# Patient Record
Sex: FEMALE | Race: WHITE | NOT HISPANIC OR LATINO | Employment: FULL TIME | URBAN - METROPOLITAN AREA
[De-identification: names, ages, dates, MRNs, and addresses within clinical notes are randomized per-mention and may not be internally consistent; named-entity substitution may affect disease eponyms.]

---

## 2022-05-06 ENCOUNTER — TELEPHONE (OUTPATIENT)
Dept: OBGYN CLINIC | Facility: CLINIC | Age: 27
End: 2022-05-06

## 2022-05-06 DIAGNOSIS — Z30.41 SURVEILLANCE FOR BIRTH CONTROL, ORAL CONTRACEPTIVES: Primary | ICD-10-CM

## 2022-05-06 NOTE — TELEPHONE ENCOUNTER
Silvia Arenas said was approx  1-2 weeks with starting current pill pack  Advised her to stop taking pills x 4 days then restart a new pill pack  Silvia Arenas also needs a renewal on her pills too   Sent message to Dr Brandi Blackmon re; medication renewal

## 2022-05-09 RX ORDER — NORGESTIMATE AND ETHINYL ESTRADIOL 0.25-0.035
1 KIT ORAL DAILY
Qty: 28 TABLET | Refills: 3 | Status: SHIPPED | OUTPATIENT
Start: 2022-05-09 | End: 2022-07-22 | Stop reason: SDUPTHER

## 2022-05-10 ENCOUNTER — TELEPHONE (OUTPATIENT)
Dept: OBGYN CLINIC | Facility: CLINIC | Age: 27
End: 2022-05-10

## 2022-05-10 NOTE — TELEPHONE ENCOUNTER
Branda Dancer called in on Friday regarding break through bleeding and was advised to stop her pill pack for four days and to start up again after 4 days  Branda Dancer was calling to clarify if she is to start a new pill pack or resume the pill pack she stopped  Advised her she is to start a new pack the pack she stopped she should throw away  Keep an eye on break through bleeding if it occurs again [place] the days on her pill pack so we can see when the bleeding occurs and contact the office

## 2022-05-15 ENCOUNTER — TELEPHONE (OUTPATIENT)
Dept: OBGYN CLINIC | Facility: CLINIC | Age: 27
End: 2022-05-15

## 2022-05-15 NOTE — TELEPHONE ENCOUNTER
Patient paged on-call Doc stating she is in [place] and has heavy bleeding  Called patient and patient states she is on 1st week of new pack of OCP and yesterday the bleeding was light but today she is passing golf size clot every hour  In 6-7 hours has changed 3 pads  Advised patient to continue with OCPs, take 600 mg of Advil every 6 hours as needed for cramping and bleeding  Advised patient to seek medical attention if soaking a pad an hour  Advised patient to keep menstrual/bleeding calender and review with Dr Ilya Ferro if irregular bleeding or BTB continues

## 2022-05-27 ENCOUNTER — TELEPHONE (OUTPATIENT)
Dept: OBGYN CLINIC | Facility: CLINIC | Age: 27
End: 2022-05-27

## 2022-05-27 DIAGNOSIS — N92.6 IRREGULAR BLEEDING: Primary | ICD-10-CM

## 2022-05-27 NOTE — TELEPHONE ENCOUNTER
Zenaida Boyd called the nurses line to discuss continuation of breakthrough bleeding on her OCP  She called on 5/6 regarding bleeding about 1-2 weeks in on her new pill pack  At that time she was advises to stop the current pill pack and after a 4 day break start a new pill pack  Since starting the new pill pack she is continuing with breakthrough bleeding  She called the on-call Dr  On 5/15 which was Dr Tricia Ybarra and spoke to her regarding heavy flow with clots  Dr Tricia Ybarra advised her to continue OCP and take 600mg of advil every 6 hours as needed  And went over precautions for seeking medical advice  She still is having breakthrough bleeding/spotting  It is not as heavy as the week of 5/15  She does have a history of anemia and is wondering if she should get labwork done to check her Iron level  Pt has an apt with Dr Santy Pineda on 7/22/22  Dr Santy Pineda,  Please advise, can we order a hemoglobin for pt, and would you recommend early OV?

## 2022-05-27 NOTE — TELEPHONE ENCOUNTER
Orders for TSH and CBC placed for [facility], look on nursing printer please  Yes, should be seen earlier   Last visit 2/2021

## 2022-05-27 NOTE — TELEPHONE ENCOUNTER
Spoke with Catherine Mendiola, she is aware of labs to be drawn at [address] [place] [address] and transferred upfront to get an apt with Dr Danelle Cannon before her visit on 7/22 for abnormal bleeding

## 2022-05-28 LAB
BASOPHILS # BLD AUTO: 0 X10E3/UL (ref 0–0.2)
BASOPHILS NFR BLD AUTO: 1 %
EOSINOPHIL # BLD AUTO: 0.1 X10E3/UL (ref 0–0.4)
EOSINOPHIL NFR BLD AUTO: 2 %
ERYTHROCYTE [DISTWIDTH] IN BLOOD BY AUTOMATED COUNT: 14.4 % (ref 11.7–15.4)
HCT VFR BLD AUTO: 24.1 % (ref 34–46.6)
HGB BLD-MCNC: 8 G/DL (ref 11.1–15.9)
IMM GRANULOCYTES # BLD: 0 X10E3/UL (ref 0–0.1)
IMM GRANULOCYTES NFR BLD: 0 %
LYMPHOCYTES # BLD AUTO: 2.8 X10E3/UL (ref 0.7–3.1)
LYMPHOCYTES NFR BLD AUTO: 41 %
MCH RBC QN AUTO: 28.5 PG (ref 26.6–33)
MCHC RBC AUTO-ENTMCNC: 33.2 G/DL (ref 31.5–35.7)
MCV RBC AUTO: 86 FL (ref 79–97)
MONOCYTES # BLD AUTO: 0.4 X10E3/UL (ref 0.1–0.9)
MONOCYTES NFR BLD AUTO: 6 %
NEUTROPHILS # BLD AUTO: 3.5 X10E3/UL (ref 1.4–7)
NEUTROPHILS NFR BLD AUTO: 50 %
PLATELET # BLD AUTO: 410 X10E3/UL (ref 150–450)
RBC # BLD AUTO: 2.81 X10E6/UL (ref 3.77–5.28)
TSH SERPL DL<=0.005 MIU/L-ACNC: 3.52 UIU/ML (ref 0.45–4.5)
WBC # BLD AUTO: 6.9 X10E3/UL (ref 3.4–10.8)

## 2022-05-31 ENCOUNTER — TELEPHONE (OUTPATIENT)
Dept: OBGYN CLINIC | Facility: CLINIC | Age: 27
End: 2022-05-31

## 2022-05-31 NOTE — TELEPHONE ENCOUNTER
Spoke with Mk Montalvo, she is aware of her Hemoglobin and already has a call in to the her hematologist waiting for a call back  She does have an apt with Dr Martita Galvan on 6/28 which she said she would be glad to discuss the IUD with her then  Delgrace Montalvo had questions regarding her current pill pack should she skip to the next pill pack or take the placebo pill? Dr Martita Galvan,  Please advise, and the earlier Delos Creamrafat could get is 6/28 is that okay to discuss IUD?

## 2022-05-31 NOTE — TELEPHONE ENCOUNTER
She should skip the placebo week after this pill pack in an attempt to hold off the bleeding  The 6/28/22 would be fine

## 2022-05-31 NOTE — TELEPHONE ENCOUNTER
Please call Heather Olivia with labs  Hg 8 - has h/o anemia with iron infusions with hematology  Her TSH was normal    Encourage her to call Dr Jennifer Mendez (?I believe that is who she saw) for follow up and repeat infusions  She should consider the progesterone IUD for control of the menorrhagia  Please schedule appointment with me to discuss  Thanks

## 2022-06-23 ENCOUNTER — VBI (OUTPATIENT)
Dept: ADMINISTRATIVE | Facility: OTHER | Age: 27
End: 2022-06-23

## 2022-06-23 NOTE — TELEPHONE ENCOUNTER
Upon review of the In Basket request we were able to locate, review, and update the patient chart as requested for Pap Smear (HPV) aka Cervical Cancer Screening  Any additional questions or concerns should be emailed to the Practice Liaisons via [email or web address] email, please do not reply via In Basket      Thank you  Waylon Montiel

## 2022-06-25 PROBLEM — R22.1 NECK NODULE: Status: ACTIVE | Noted: 2022-06-25

## 2022-06-25 PROBLEM — D50.0 IRON DEFICIENCY ANEMIA DUE TO CHRONIC BLOOD LOSS: Status: ACTIVE | Noted: 2022-06-25

## 2022-06-25 PROBLEM — N92.1 MENORRHAGIA WITH IRREGULAR CYCLE: Status: ACTIVE | Noted: 2022-06-25

## 2022-06-25 NOTE — PROGRESS NOTES
Assessment/Plan:    Iron deficiency anemia due to chronic blood loss  Last infusion last week with improved symptoms, scheduled today for 2nd  Dr Morris Tucker    Menorrhagia with irregular cycle - resolved with OCPs  Had normal light two day cycles for one year on OCPs until ran out  Off for 3ks when out of rx with heavy bleeding VERY heavy while in [facility] 2nd week of May  Started back up in May  Bleeding continues, now light  Hg was down to 8, saw hematology and iron infusions began again last week  Normal exam, no active bleeding  Uterus small  U/s ordered, normal 3/2020  Will get blood work from hematology for Bethany Chroman, hemophilia and TSH today  Recommend doubling up on pill until bleeding stops, then daily, hopefully to achieve 21 days without bleeding  Then back to cyclic admin of pill     Option of progesterone IUD reviewed, R&B discussed  NSA, had success with OCPs in past  Will attempt OCP management first  Agrees to plan  BMI 35 0-35 9,adult  Has lost 40 lbs since last visit with healthy life choices  Diagnoses and all orders for this visit:    Menorrhagia with irregular cycle - resolved with OCPs    Abnormal vaginal bleeding  -     Cancel: POCT urine HCG  -     US pelvis complete w transvaginal; Future    BMI 35 0-35 9,adult    Other orders  -     albuterol (PROVENTIL HFA,VENTOLIN HFA) 90 mcg/act inhaler; Inhale 2 puffs every 4 (four) hours as needed  -     cetirizine (ZyrTEC) 10 mg tablet; Take 10 mg by mouth daily          Subjective:      Patient ID: Duane Hsu is a 33 y o  female  Here for prolonged bleeding  The following portions of the patient's history were reviewed and updated as appropriate:   She  has a past medical history of Anemia, Asthma, Gardasil complete, History of pelvic ultrasound (02/10/2020), and Marfan syndrome    She   Patient Active Problem List    Diagnosis Date Noted    BMI 35 0-35 9,adult 06/28/2022    Menorrhagia with irregular cycle - resolved with OCPs 06/25/2022    Iron deficiency anemia due to chronic blood loss 06/25/2022    Neck nodule 06/25/2022    Marfan syndrome 03/18/2014    Hx of echocardiogram 03/18/2014     She  has a past surgical history that includes Tonsillectomy (2001)  Her family history includes Marfan syndrome in her brother, brother, father, and paternal grandmother  She  reports that she has never smoked  She does not have any smokeless tobacco history on file  No history on file for alcohol use and drug use  Current Outpatient Medications   Medication Sig Dispense Refill    albuterol (PROVENTIL HFA,VENTOLIN HFA) 90 mcg/act inhaler Inhale 2 puffs every 4 (four) hours as needed      cetirizine (ZyrTEC) 10 mg tablet Take 10 mg by mouth daily      norgestimate-ethinyl estradiol (Ortho-Cyclen, 28,) 0 25-35 MG-MCG per tablet Take 1 tablet by mouth daily 28 tablet 3     No current facility-administered medications for this visit  She has No Known Allergies       Review of Systems  +prolonged bleeding, now lighter    Objective:      /70 (BP Location: Left arm, Patient Position: Sitting, Cuff Size: Standard)   Ht 5' 10" (1 778 m)   Wt 112 kg (247 lb 9 6 oz)   BMI 35 53 kg/m²          Physical Exam    Appears well, no apparent distress  Does not appear anxious or depressed    Pelvic exam: blood stained external genitalia, urethral meatus normal, vagina without lesions,minimal blood no pooling, cervix without lesions, no active bleeding, bimanual limited due to habitus, uterus small, non tender, no adnexal masses, non tender  Rectal exam: deferred

## 2022-06-28 ENCOUNTER — OFFICE VISIT (OUTPATIENT)
Dept: OBGYN CLINIC | Facility: CLINIC | Age: 27
End: 2022-06-28
Payer: COMMERCIAL

## 2022-06-28 VITALS
DIASTOLIC BLOOD PRESSURE: 70 MMHG | BODY MASS INDEX: 35.45 KG/M2 | WEIGHT: 247.6 LBS | SYSTOLIC BLOOD PRESSURE: 122 MMHG | HEIGHT: 70 IN

## 2022-06-28 DIAGNOSIS — N92.1 MENORRHAGIA WITH IRREGULAR CYCLE: Primary | ICD-10-CM

## 2022-06-28 DIAGNOSIS — N93.9 ABNORMAL VAGINAL BLEEDING: ICD-10-CM

## 2022-06-28 PROCEDURE — 99214 OFFICE O/P EST MOD 30 MIN: CPT | Performed by: OBSTETRICS & GYNECOLOGY

## 2022-06-28 RX ORDER — CETIRIZINE HYDROCHLORIDE 10 MG/1
10 TABLET ORAL DAILY
COMMUNITY

## 2022-06-28 RX ORDER — ALBUTEROL SULFATE 90 UG/1
2 AEROSOL, METERED RESPIRATORY (INHALATION) EVERY 4 HOURS PRN
COMMUNITY

## 2022-06-28 NOTE — PATIENT INSTRUCTIONS
Take two OCPs daily until no bleeding and then decrease to one daily hopefully to achieve 21 days without bleeding  If you do not have success in 7 days of doubling up, call  Get the pelvic ultrasound  As Dr Alan Mendoza for blood work to check for [employer] and hemophilia (TSH as well)

## 2022-06-28 NOTE — LETTER
June 28, 2022     [address]  [facility] [address]    Patient: Duane Hsu   YOB: 1995   Date of Visit: 6/28/2022       Dear Dr Rusty Ibarra: Thank you for referring Duane Hsu to me for evaluation  Below are my notes for this consultation  If you have questions, please do not hesitate to call me  I look forward to following your patient along with you  Sincerely,        Darian Gonsales MD        CC: No Recipients  Darian Gonsales MD  6/28/2022  2:22 PM  Incomplete  Assessment/Plan:    Iron deficiency anemia due to chronic blood loss  Last infusion last week with improved symptoms, scheduled today for 2nd  Dr Morris Tucker    Menorrhagia with irregular cycle - resolved with OCPs  Had normal light two day cycles for one year on OCPs until ran out  Off for 3ks when out of rx with heavy bleeding VERY heavy while in [facility] 2nd week of May  Started back up in May  Bleeding continues, now light  Hg was down to 8, saw hematology and iron infusions began again last week  Normal exam, no active bleeding  Uterus small  U/s ordered, normal 3/2020  Will get blood work from hematology for [address], hemophilia and TSH today  Recommend doubling up on pill until bleeding stops, then daily, hopefully to achieve 21 days without bleeding  Then back to cyclic admin of pill     Option of progesterone IUD reviewed, R&B discussed  NSA, had success with OCPs in past  Will attempt OCP management first  Agrees to plan  BMI 35 0-35 9,adult  Has lost 40 lbs since last visit with healthy life choices  Diagnoses and all orders for this visit:    Menorrhagia with irregular cycle - resolved with OCPs    Abnormal vaginal bleeding  -     Cancel: POCT urine HCG  -     US pelvis complete w transvaginal; Future    BMI 35 0-35 9,adult    Other orders  -     albuterol (PROVENTIL HFA,VENTOLIN HFA) 90 mcg/act inhaler;  Inhale 2 puffs every 4 (four) hours as needed  -     cetirizine (ZyrTEC) 10 mg tablet; Take 10 mg by mouth daily          Subjective:      Patient ID: Glendy Pappas is a 33 y o  female  Here for prolonged bleeding  The following portions of the patient's history were reviewed and updated as appropriate:   She  has a past medical history of Anemia, Asthma, Gardasil complete, History of pelvic ultrasound (02/10/2020), and Marfan syndrome  She   Patient Active Problem List    Diagnosis Date Noted    BMI 35 0-35 9,adult 06/28/2022    Menorrhagia with irregular cycle - resolved with OCPs 06/25/2022    Iron deficiency anemia due to chronic blood loss 06/25/2022    Neck nodule 06/25/2022    Marfan syndrome 03/18/2014    Hx of echocardiogram 03/18/2014     She  has a past surgical history that includes Tonsillectomy (2001)  Her family history includes Marfan syndrome in her brother, brother, father, and paternal grandmother  She  reports that she has never smoked  She does not have any smokeless tobacco history on file  No history on file for alcohol use and drug use  Current Outpatient Medications   Medication Sig Dispense Refill    albuterol (PROVENTIL HFA,VENTOLIN HFA) 90 mcg/act inhaler Inhale 2 puffs every 4 (four) hours as needed      cetirizine (ZyrTEC) 10 mg tablet Take 10 mg by mouth daily      norgestimate-ethinyl estradiol (Ortho-Cyclen, 28,) 0 25-35 MG-MCG per tablet Take 1 tablet by mouth daily 28 tablet 3     No current facility-administered medications for this visit  She has No Known Allergies       Review of Systems  +prolonged bleeding, now lighter    Objective:      /70 (BP Location: Left arm, Patient Position: Sitting, Cuff Size: Standard)   Ht 5' 10" (1 778 m)   Wt 112 kg (247 lb 9 6 oz)   BMI 35 53 kg/m²          Physical Exam    Appears well, no apparent distress  Does not appear anxious or depressed    Pelvic exam: blood stained external genitalia, urethral meatus normal, vagina without lesions,minimal blood no pooling, cervix without lesions, no active bleeding, bimanual limited due to habitus, uterus small, non tender, no adnexal masses, non tender  Rectal exam: deferred      Maggie Rojas MD  6/25/2022  9:30 AM  Sign when Signing Visit  Assessment/Plan:    No problem-specific Assessment & Plan notes found for this encounter  There are no diagnoses linked to this encounter  Subjective:      Patient ID: Amisha Altman is a 33 y o  female  HPI    The following portions of the patient's history were reviewed and updated as appropriate:   She  has a past medical history of Anemia, Asthma, and Marfan syndrome  She There are no problems to display for this patient  She  has a past surgical history that includes Tonsillectomy (2001)  Her family history is not on file  She  reports that she has never smoked  She does not have any smokeless tobacco history on file  No history on file for alcohol use and drug use  Current Outpatient Medications   Medication Sig Dispense Refill    norgestimate-ethinyl estradiol (Ortho-Cyclen, 28,) 0 25-35 MG-MCG per tablet Take 1 tablet by mouth daily 28 tablet 3     No current facility-administered medications for this visit  She has no allergies on file       Review of Systems      Objective: There were no vitals taken for this visit           Physical Exam

## 2022-06-28 NOTE — ASSESSMENT & PLAN NOTE
Had normal light two day cycles for one year on OCPs until ran out  Off for 3ks when out of rx with heavy bleeding VERY heavy while in [place] 2nd week of May  Started back up in May  Bleeding continues, now light  Hg was down to 8, saw hematology and iron infusions began again last week  Normal exam, no active bleeding  Uterus small  U/s ordered, normal 3/2020  Will get blood work from hematology for [facility] Jacquie, hemophilia and TSH today  Recommend doubling up on pill until bleeding stops, then daily, hopefully to achieve 21 days without bleeding  Then back to cyclic admin of pill     Option of progesterone IUD reviewed, R&B discussed  NSA, had success with OCPs in past  Will attempt OCP management first  Agrees to plan 
Has lost 40 lbs since last visit with healthy life choices 
Last infusion last week with improved symptoms, scheduled today for 2nd   Dr Debbi Cabezas
Stopped pill for 3 wks (ran out of pill); bleeding started heavy and VERY heavy while in [place] 2nd week of May  Started back up in May, light but continues  Had normal cycles for one year on OCPs until ran out for 2 days of flow 
15-Apr-2019 06:55

## 2022-07-21 NOTE — PROGRESS NOTES
Assessment/Plan:    Encounter for gynecological examination (general) (routine) without abnormal findings  Bleeding stopped after five days of doubling up on OCPs  Only min spotting on 4th day of new pack  Normal breast and pelvic exams  Pap done  OCPs refilled  U/s to be completed, agrees to schedule  Labs with hematologist next month  Diagnoses and all orders for this visit:    Encounter for gynecological examination (general) (routine) without abnormal findings    Surveillance for birth control, oral contraceptives  -     norgestimate-ethinyl estradiol (Ortho-Cyclen, 28,) 0 25-35 MG-MCG per tablet; Take 1 tablet by mouth daily    Screening for malignant neoplasm of the cervix  -     IGP, rfx Aptima HPV ASCU    Other orders  -     cephalexin (KEFLEX) 250 mg capsule; Take 250 mg by mouth 4 (four) times a day          Subjective:      Patient ID: Beverley Hart is a 33 y o  female  Here for well check  The following portions of the patient's history were reviewed and updated as appropriate:   She  has a past medical history of Anemia, Asthma, Finger infection (07/16/2022), Gardasil complete, History of pelvic ultrasound (02/10/2020), and Marfan syndrome  She   Patient Active Problem List    Diagnosis Date Noted    Encounter for gynecological examination (general) (routine) without abnormal findings 07/22/2022    BMI 35 0-35 9,adult 06/28/2022    Menorrhagia with irregular cycle - resolved with OCPs 06/25/2022    Iron deficiency anemia due to chronic blood loss 06/25/2022    Neck nodule 06/25/2022    Marfan syndrome 03/18/2014    Hx of echocardiogram 03/18/2014     She  has a past surgical history that includes Tonsillectomy (2001)  Her family history includes Marfan syndrome in her brother, brother, father, and paternal grandmother  She  reports that she has never smoked  She has never used smokeless tobacco  She reports previous alcohol use   She reports that she does not use drugs   Current Outpatient Medications   Medication Sig Dispense Refill    albuterol (PROVENTIL HFA,VENTOLIN HFA) 90 mcg/act inhaler Inhale 2 puffs every 4 (four) hours as needed      cephalexin (KEFLEX) 250 mg capsule Take 250 mg by mouth 4 (four) times a day      cetirizine (ZyrTEC) 10 mg tablet Take 10 mg by mouth daily      norgestimate-ethinyl estradiol (Ortho-Cyclen, 28,) 0 25-35 MG-MCG per tablet Take 1 tablet by mouth daily 90 tablet 4     No current facility-administered medications for this visit  She has No Known Allergies       Review of Systems  No breast, bladder, bowel changes   No new persistent pain, bloating, early satiety or pelvic pressure      Objective:      /58   Ht 5' 10 25" (1 784 m)   Wt 111 kg (245 lb 6 4 oz)   LMP 07/11/2022 (Exact Date)   Breastfeeding No   BMI 34 96 kg/m²          Physical Exam    General appearance: no distress, pleasant  Neck: thyroid without nodules or thyromegaly, no palpable adenopathy  Lymph nodes: no palpable adenopathy  Breasts: no masses, nodes or skin changes  Abdomen: soft, non tender, no palpable masses  Pelvic exam: normal external genitalia, urethral meatus normal, vagina without lesions, cervix with min menstrual old blood and without lesions, uterus small, non tender, no adnexal masses, non tender  Rectal exam: deferred

## 2022-07-22 ENCOUNTER — ANNUAL EXAM (OUTPATIENT)
Dept: OBGYN CLINIC | Facility: CLINIC | Age: 27
End: 2022-07-22
Payer: COMMERCIAL

## 2022-07-22 VITALS
WEIGHT: 245.4 LBS | HEIGHT: 70 IN | SYSTOLIC BLOOD PRESSURE: 122 MMHG | BODY MASS INDEX: 35.13 KG/M2 | DIASTOLIC BLOOD PRESSURE: 58 MMHG

## 2022-07-22 DIAGNOSIS — Z12.4 SCREENING FOR MALIGNANT NEOPLASM OF THE CERVIX: ICD-10-CM

## 2022-07-22 DIAGNOSIS — Z30.41 SURVEILLANCE FOR BIRTH CONTROL, ORAL CONTRACEPTIVES: ICD-10-CM

## 2022-07-22 DIAGNOSIS — Z01.419 ENCOUNTER FOR GYNECOLOGICAL EXAMINATION (GENERAL) (ROUTINE) WITHOUT ABNORMAL FINDINGS: Primary | ICD-10-CM

## 2022-07-22 PROCEDURE — S0612 ANNUAL GYNECOLOGICAL EXAMINA: HCPCS | Performed by: OBSTETRICS & GYNECOLOGY

## 2022-07-22 RX ORDER — CEPHALEXIN 250 MG/1
250 CAPSULE ORAL 4 TIMES DAILY
COMMUNITY
Start: 2022-07-16

## 2022-07-22 RX ORDER — NORGESTIMATE AND ETHINYL ESTRADIOL 0.25-0.035
1 KIT ORAL DAILY
Qty: 90 TABLET | Refills: 4 | Status: SHIPPED | OUTPATIENT
Start: 2022-07-22

## 2022-07-22 NOTE — PATIENT INSTRUCTIONS
Return to office in one year unless having any problems  Please call the office if you do not hear about your results in 10-14 days  Call in six months to schedule your annual visit

## 2022-07-22 NOTE — ASSESSMENT & PLAN NOTE
Bleeding stopped after five days of doubling up on OCPs  Only min spotting on 4th day of new pack  Normal breast and pelvic exams  Pap done  OCPs refilled  U/s to be completed, agrees to schedule  Labs with hematologist next month

## 2022-07-22 NOTE — LETTER
July 22, 2022     Ed Santamaria, [address]  [place] [address]    Patient: Rahel Crews   YOB: 1995   Date of Visit: 7/22/2022       Dear Dr Gabe Marquez: Thank you for referring Rahel Crews to me for evaluation  Below are my notes for this consultation  If you have questions, please do not hesitate to call me  I look forward to following your patient along with you  Sincerely,        Emma Clark MD        CC: No Recipients  Emma Clark MD  7/22/2022  2:36 PM  Sign when Signing Visit  Assessment/Plan:    Encounter for gynecological examination (general) (routine) without abnormal findings  Bleeding stopped after five days of doubling up on OCPs  Only min spotting on 4th day of new pack  Normal breast and pelvic exams  Pap done  OCPs refilled  U/s to be completed, agrees to schedule  Labs with hematologist next month  Diagnoses and all orders for this visit:    Encounter for gynecological examination (general) (routine) without abnormal findings    Surveillance for birth control, oral contraceptives  -     norgestimate-ethinyl estradiol (Ortho-Cyclen, 28,) 0 25-35 MG-MCG per tablet; Take 1 tablet by mouth daily    Screening for malignant neoplasm of the cervix  -     IGP, rfx Aptima HPV ASCU    Other orders  -     cephalexin (KEFLEX) 250 mg capsule; Take 250 mg by mouth 4 (four) times a day          Subjective:      Patient ID: Rahel Crews is a 33 y o  female  Here for well check  The following portions of the patient's history were reviewed and updated as appropriate:   She  has a past medical history of Anemia, Asthma, Finger infection (07/16/2022), Gardasil complete, History of pelvic ultrasound (02/10/2020), and Marfan syndrome    She   Patient Active Problem List    Diagnosis Date Noted    Encounter for gynecological examination (general) (routine) without abnormal findings 07/22/2022    BMI 35 0-35 9,adult 06/28/2022    Menorrhagia with irregular cycle - resolved with OCPs 06/25/2022    Iron deficiency anemia due to chronic blood loss 06/25/2022    Neck nodule 06/25/2022    Marfan syndrome 03/18/2014    Hx of echocardiogram 03/18/2014     She  has a past surgical history that includes Tonsillectomy (2001)  Her family history includes Marfan syndrome in her brother, brother, father, and paternal grandmother  She  reports that she has never smoked  She has never used smokeless tobacco  She reports previous alcohol use  She reports that she does not use drugs  Current Outpatient Medications   Medication Sig Dispense Refill    albuterol (PROVENTIL HFA,VENTOLIN HFA) 90 mcg/act inhaler Inhale 2 puffs every 4 (four) hours as needed      cephalexin (KEFLEX) 250 mg capsule Take 250 mg by mouth 4 (four) times a day      cetirizine (ZyrTEC) 10 mg tablet Take 10 mg by mouth daily      norgestimate-ethinyl estradiol (Ortho-Cyclen, 28,) 0 25-35 MG-MCG per tablet Take 1 tablet by mouth daily 90 tablet 4     No current facility-administered medications for this visit  She has No Known Allergies       Review of Systems  No breast, bladder, bowel changes   No new persistent pain, bloating, early satiety or pelvic pressure      Objective:      /58   Ht 5' 10 25" (1 784 m)   Wt 111 kg (245 lb 6 4 oz)   LMP 07/11/2022 (Exact Date)   Breastfeeding No   BMI 34 96 kg/m²          Physical Exam    General appearance: no distress, pleasant  Neck: thyroid without nodules or thyromegaly, no palpable adenopathy  Lymph nodes: no palpable adenopathy  Breasts: no masses, nodes or skin changes  Abdomen: soft, non tender, no palpable masses  Pelvic exam: normal external genitalia, urethral meatus normal, vagina without lesions, cervix with min menstrual old blood and without lesions, uterus small, non tender, no adnexal masses, non tender  Rectal exam: deferred

## 2022-07-27 LAB
CYTOLOGIST CVX/VAG CYTO: NORMAL
DX ICD CODE: NORMAL
OTHER STN SPEC: NORMAL
OTHER STN SPEC: NORMAL
PATH REPORT.FINAL DX SPEC: NORMAL
SL AMB NOTE:: NORMAL
SL AMB SPECIMEN ADEQUACY: NORMAL
SL AMB TEST METHODOLOGY: NORMAL

## 2023-07-07 ENCOUNTER — HOSPITAL ENCOUNTER (OUTPATIENT)
Dept: ULTRASOUND IMAGING | Facility: HOSPITAL | Age: 28
End: 2023-07-07
Attending: OBSTETRICS & GYNECOLOGY
Payer: COMMERCIAL

## 2023-07-07 DIAGNOSIS — N93.9 ABNORMAL VAGINAL BLEEDING: ICD-10-CM

## 2023-07-07 PROCEDURE — 76856 US EXAM PELVIC COMPLETE: CPT

## 2023-07-07 PROCEDURE — 76830 TRANSVAGINAL US NON-OB: CPT
